# Patient Record
Sex: MALE | Race: WHITE | NOT HISPANIC OR LATINO | Employment: FULL TIME | URBAN - METROPOLITAN AREA
[De-identification: names, ages, dates, MRNs, and addresses within clinical notes are randomized per-mention and may not be internally consistent; named-entity substitution may affect disease eponyms.]

---

## 2018-02-27 ENCOUNTER — OFFICE VISIT (OUTPATIENT)
Dept: OBGYN CLINIC | Facility: CLINIC | Age: 33
End: 2018-02-27
Payer: COMMERCIAL

## 2018-02-27 VITALS
HEIGHT: 64 IN | WEIGHT: 184 LBS | SYSTOLIC BLOOD PRESSURE: 130 MMHG | BODY MASS INDEX: 31.41 KG/M2 | DIASTOLIC BLOOD PRESSURE: 72 MMHG

## 2018-02-27 DIAGNOSIS — M75.22 BICEPS TENDINITIS OF LEFT UPPER EXTREMITY: ICD-10-CM

## 2018-02-27 DIAGNOSIS — G56.22 CUBITAL TUNNEL SYNDROME, LEFT: Primary | ICD-10-CM

## 2018-02-27 PROCEDURE — 99203 OFFICE O/P NEW LOW 30 MIN: CPT | Performed by: ORTHOPAEDIC SURGERY

## 2018-02-27 RX ORDER — NAPROXEN 500 MG/1
250 TABLET ORAL 2 TIMES DAILY WITH MEALS
COMMUNITY

## 2018-02-27 NOTE — PROGRESS NOTES
Assessment/Plan:  1  Cubital tunnel syndrome, left     2  Biceps tendinitis of left upper extremity       Jhon has left-sided elbow pain which does appear to be a muscular injury to the biceps or brachialis muscle in the left arm  He does not appear to have a traumatic rupture of the distal biceps as he has no pop by deformity or ecchymosis and full strength on examination  He can likely continue to exercise as tolerated  If his pain persists or worsens we could consider imaging with an MRI  He also has developed cubital tunnel syndrome on the left side  I did inform him of trying to avoid striking the ulnar nerve and that his symptoms can be exacerbated with trauma  I did give him some exercises and avoidance of certain activities in the gym could improve this  If he has persistent pain and EMG or imaging with an MRI could be of value  Subjective:   Apollo Nicolas is a 34 y o  male who presents for evaluation for a left elbow injury  He states that he injured his elbow around 2 weeks ago while he was working out in [employer]  He states that he was doing upright row exercises and felt a popping sensation over his left upper arm and biceps region  He did have some initial swelling and discomfort without bruising  He has not noticed a deformity in his elbow or biceps tendon  He states that he has been resting and trying to reduce the amount of upper body lifting ever since  The pain has improved but he still feeling some discomfort to deep palpation over the biceps  He denies any weakness  He denies any radiating pain  He denies any numbness  He also reports a similar discomfort in the elbow on the opposite side of the elbow near the olecranon  He states that this can bother him from time to time and does not seem to be related to that exact injury  He reports an occasional sharp stabbing pain over the medial elbow that could radiate down the arm    He will occasionally get some numbness into the fingers in the hand in ulnar distribution  This seems to worsen any time he has trauma to this area of the elbow after a fall or with exercise  He does report this occurring after a fall initially over the elbow  Review of Systems      History reviewed  No pertinent past medical history  History reviewed  No pertinent surgical history  Family History   Problem Relation Age of Onset    Arthritis Maternal Grandmother     Arthritis Maternal Grandfather        Social History     Occupational History    Not on file  Social History Main Topics    Smoking status: Current Every Day Smoker     Packs/day: 1 00     Years: 13 00    Smokeless tobacco: Never Used    Alcohol use Yes      Comment: social    Drug use: No    Sexual activity: Not on file         Current Outpatient Prescriptions:     naproxen (NAPROSYN) 500 mg tablet, Take 250 mg by mouth 2 (two) times a day with meals, Disp: , Rfl:     No Known Allergies    Objective:  Vitals:    02/27/18 1634   BP: 130/72       Right Elbow Exam     Tenderness   Right elbow tenderness location: Tenderness to palpation over the lateral aspect of the biceps and brachialis muscle  No tenderness to palpation over distal biceps insertion  Able to fully palpate and Ephraim the distal biceps insertion  No overlying ecchymosis  No luisa deformity prese  Range of Motion   The patient has normal right elbow ROM  Muscle Strength   The patient has normal right elbow strength  Tests Varus: negative  Valgus: negative  Tinel's Sign (cubital tunnel): positive    Other   Erythema: absent  Sensation: normal  Pulse: present            Physical Exam   Constitutional: He is oriented to person, place, and time  He appears well-developed  HENT:   Head: Normocephalic and atraumatic  Eyes: Conjunctivae are normal    Neck: Neck supple  Cardiovascular: Intact distal pulses  Pulmonary/Chest: Effort normal    Abdominal: Soft     Neurological: He is alert and oriented to person, place, and time  Skin: Skin is warm and dry  Psychiatric: He has a normal mood and affect  His behavior is normal    Vitals reviewed

## 2020-07-15 NOTE — PROGRESS NOTES
Assessment and Plan:   Mr Ally Cohen is a 41-year-old  male with history significant for Lyme disease, who presents for further evaluation of chronic bilateral shoulder and knee pain, and to rule out concerns for rheumatoid arthritis given his family history  He is self referred  Jorge Pantoja presents today for further evaluation of chronic bilateral shoulder and knee pain, that appears intermittently and is most likely related to overuse activities  He does not particularly describe other concerning joint pains or symptoms suggestive of an inflammatory arthritis such as recent joint swelling or prolonged morning stiffness  His physical examination is unrevealing today  I reviewed the labs done by his primary care physician which appears unrevealing and advised him that the minimal elevation in the ESR is nonspecific and I would not be concerned for ongoing inflammation     - He does not need any further evaluation today - I offered him baseline x-rays, but he would like to hold off on this for now which I think is reasonable as they would likely be low yield  He can continue to manage his symptoms with NSAIDs as needed  I did advise him if over time he is to notice any worsening joint pains or recurrent joint swelling, to call me back at which time we can re-evaluate if indicated  Plan:  Diagnoses and all orders for this visit:    Chronic pain of both shoulders    Chronic pain of both knees    History of Lyme disease    Vitamin D insufficiency    Family history of rheumatoid arthritis      Activities as tolerated    Diet: low carb/low fat, more greens/vegetables, adequate hydration  Exercise: try to maintain a low impact exercise regimen as much as possible  Walk for 30 minutes a day for at least 3 days a week    Encouraged to maintain good sleep hygiene  Continue other medications as prescribed by PCP and other specialists        RTC PRN         HPI  Mr Ally Cohen is a 41-year-old  male with history significant for Lyme disease, who presents for further evaluation of chronic bilateral shoulder and knee pain, and to rule out concerns for rheumatoid arthritis given his family history  He is self referred  Patient reports he has experienced chronic intermittent pain of his bilateral shoulders, hips and knees  He states that his symptoms occur intermittently and are usually aggravated by activities  He is not symptomatic while performing the activities, but will notice of flare-up later on  He denies any significant joint pains of his hands, wrists, elbows, ankles or feet  He has not noticed any joint swelling  He does experience morning stiffness which affects her diffusely and typically improves within 20 minutes after taking a hot shower  He does not really take any over-the-counter medications, but on occasion has tried ibuprofen which does help  He reports approximately 8-9 years ago he was diagnosed with Lyme disease and at that time had swelling of his right shoulder and left knee  He was on multiple courses of antibiotics which significantly helped, and he states usually on an annual basis if his symptoms flare-up he will receive a 2 week course of doxycycline which helps  Other than the Lyme disease he reports being healthy  He denies fevers, unintentional weight loss, inflammatory eye disease, skin rash, psoriasis, mouth/nose ulcers, swollen glands or inflammatory bowel disease  He reports a family history of rheumatoid arthritis in his paternal aunt  Given his family history he was concerned about ruling out rheumatoid arthritis and was seen by [facility]  He had testing done which showed a minimally elevated ESR of 17 and a borderline low vitamin-D level of 21 3  His ALT was elevated at 82  An MIRELLA, rheumatoid factor, anti CCP antibody, TSH, urinalysis, CBC and remainder of the CMP was unremarkable    He mentions within the past year he has had an x-ray of his left shoulder done following a work related injury, that was apparently unrevealing  He has not had any other recent x-rays  The following portions of the patient's history were reviewed and updated as appropriate: allergies, current medications, past family history, past medical history, past social history, past surgical history and problem list       Review of Systems  Constitutional: Negative for weight change, fevers, chills, night sweats, fatigue  ENT/Mouth: Negative for hearing changes, ear pain, nasal congestion, sinus pain, hoarseness, sore throat, rhinorrhea, swallowing difficulty  Eyes: Negative for pain, redness, discharge, vision changes  Cardiovascular: Negative for chest pain, SOB, palpitations  Respiratory: Negative for cough, sputum, wheezing, dyspnea  Gastrointestinal: Negative for nausea, vomiting, diarrhea, constipation, pain, heartburn  Genitourinary: Negative for dysuria, urinary frequency, hematuria  Musculoskeletal: As per HPI  Skin: Negative for skin rash, color changes  Neuro: Negative for weakness, numbness, tingling, loss of consciousness  Psych: Negative for anxiety, depression  Heme/Lymph: Negative for easy bruising, bleeding, lymphadenopathy  Past Medical History:   Diagnosis Date    Lyme disease        History reviewed  No pertinent surgical history        Social History     Socioeconomic History    Marital status: Single     Spouse name: Not on file    Number of children: Not on file    Years of education: Not on file    Highest education level: Not on file   Occupational History    Not on file   Social Needs    Financial resource strain: Not on file    Food insecurity:     Worry: Not on file     Inability: Not on file    Transportation needs:     Medical: Not on file     Non-medical: Not on file   Tobacco Use    Smoking status: Former Smoker     Packs/day: 1 00     Years: 13 00     Pack years: 13 00    Smokeless tobacco: Never Used    Tobacco comment: now vaping   Substance and Sexual Activity    Alcohol use: Yes     Comment: social    Drug use: No    Sexual activity: Not on file   Lifestyle    Physical activity:     Days per week: Not on file     Minutes per session: Not on file    Stress: Not on file   Relationships    Social connections:     Talks on phone: Not on file     Gets together: Not on file     Attends Religion service: Not on file     Active member of club or organization: Not on file     Attends meetings of clubs or organizations: Not on file     Relationship status: Not on file    Intimate partner violence:     Fear of current or ex partner: Not on file     Emotionally abused: Not on file     Physically abused: Not on file     Forced sexual activity: Not on file   Other Topics Concern    Not on file   Social History Narrative    Not on file       Family History   Problem Relation Age of Onset    Arthritis Maternal Grandmother     Arthritis Maternal Grandfather        No Known Allergies      Current Outpatient Medications:     azithromycin (ZITHROMAX) 1 g powder, , Disp: , Rfl:     naproxen (NAPROSYN) 500 mg tablet, Take 250 mg by mouth 2 (two) times a day with meals, Disp: , Rfl:     terbinafine (LamISIL) 1 % cream, , Disp: , Rfl:       Objective:    Vitals:    07/16/20 0814   BP: 131/87   Pulse: 67   Temp: (!) 96 5 °F (35 8 °C)   Weight: 89 9 kg (198 lb 3 2 oz)       Physical Exam  General: Well appearing, well nourished, in no distress  Oriented x 3, normal mood and affect  Ambulating without difficulty  Skin: Good turgor, no rash, unusual bruising or prominent lesions  Hair: Normal texture and distribution  Nails: Normal color, no deformities  HEENT:  Head: Normocephalic, atraumatic  Eyes: Conjunctiva clear, sclera non-icteric, EOM intact  Neck: Supple, thyroid non-enlarged and non-tender  No lymphadenopathy  Extremities: No amputations or deformities, cyanosis, edema    Musculoskeletal:   There is no peripheral joint soft tissue swelling or tenderness noted  He has good range of motion in all joints  There is no enthesitis or dactylitis  Neurologic: Alert and oriented  No focal neurological deficits appreciated  Psychiatric: Normal mood and affect  KEVIN Strong    Rheumatology

## 2020-07-16 ENCOUNTER — OFFICE VISIT (OUTPATIENT)
Dept: RHEUMATOLOGY | Facility: CLINIC | Age: 35
End: 2020-07-16
Payer: COMMERCIAL

## 2020-07-16 VITALS
DIASTOLIC BLOOD PRESSURE: 87 MMHG | WEIGHT: 198.2 LBS | HEART RATE: 67 BPM | BODY MASS INDEX: 34.02 KG/M2 | SYSTOLIC BLOOD PRESSURE: 131 MMHG | TEMPERATURE: 96.5 F

## 2020-07-16 DIAGNOSIS — Z86.19 HISTORY OF LYME DISEASE: ICD-10-CM

## 2020-07-16 DIAGNOSIS — E55.9 VITAMIN D INSUFFICIENCY: ICD-10-CM

## 2020-07-16 DIAGNOSIS — M25.561 CHRONIC PAIN OF BOTH KNEES: ICD-10-CM

## 2020-07-16 DIAGNOSIS — G89.29 CHRONIC PAIN OF BOTH SHOULDERS: Primary | ICD-10-CM

## 2020-07-16 DIAGNOSIS — M25.511 CHRONIC PAIN OF BOTH SHOULDERS: Primary | ICD-10-CM

## 2020-07-16 DIAGNOSIS — Z82.61 FAMILY HISTORY OF RHEUMATOID ARTHRITIS: ICD-10-CM

## 2020-07-16 DIAGNOSIS — G89.29 CHRONIC PAIN OF BOTH KNEES: ICD-10-CM

## 2020-07-16 DIAGNOSIS — M25.512 CHRONIC PAIN OF BOTH SHOULDERS: Primary | ICD-10-CM

## 2020-07-16 DIAGNOSIS — M25.562 CHRONIC PAIN OF BOTH KNEES: ICD-10-CM

## 2020-07-16 PROCEDURE — 99204 OFFICE O/P NEW MOD 45 MIN: CPT | Performed by: INTERNAL MEDICINE

## 2020-07-16 RX ORDER — AZITHROMYCIN 1 G
PACKET (EA) ORAL
COMMUNITY
Start: 2020-06-25

## 2020-08-18 ENCOUNTER — NURSE TRIAGE (OUTPATIENT)
Dept: OTHER | Facility: OTHER | Age: 35
End: 2020-08-18

## 2020-08-18 DIAGNOSIS — U07.1 COVID-19: Primary | ICD-10-CM

## 2020-08-18 DIAGNOSIS — U07.1 COVID-19: ICD-10-CM

## 2020-08-18 PROCEDURE — U0003 INFECTIOUS AGENT DETECTION BY NUCLEIC ACID (DNA OR RNA); SEVERE ACUTE RESPIRATORY SYNDROME CORONAVIRUS 2 (SARS-COV-2) (CORONAVIRUS DISEASE [COVID-19]), AMPLIFIED PROBE TECHNIQUE, MAKING USE OF HIGH THROUGHPUT TECHNOLOGIES AS DESCRIBED BY CMS-2020-01-R: HCPCS | Performed by: FAMILY MEDICINE

## 2020-08-18 NOTE — TELEPHONE ENCOUNTER
Reason for Disposition   [1] Caller concerned that exposure to COVID-19 occurred BUT [2] does not meet COVID-19 EXPOSURE criteria from [facility] [place]    Answer Assessment - Initial Assessment Questions  1  CLOSE CONTACT: "Who is the person with the confirmed or suspected COVID-19 infection that you were exposed to?"     Close friend    2  PLACE of CONTACT: "Where were you when you were exposed to COVID-19?" (e g , home, school, medical waiting room; which city?)      In a room with fans running, close proximity for 1-2 hours  5  DATE of CONTACT: "When did you have contact with a COVID-19 patient?" (e g , how many days ago)   Monday, August 10th  6  TRAVEL: "Have you traveled out of the country recently?" If so, "When and where? 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

## 2020-08-18 NOTE — TELEPHONE ENCOUNTER
Regarding: Covid test  ----- Message from Edi Valentine sent at 8/18/2020 11:19 AM EDT -----  "I work for [employer] and I had exposure to someone who tested positive    I am technically a , and I am required to be tested for work "

## 2020-08-19 LAB — SARS-COV-2 RNA SPEC QL NAA+PROBE: NOT DETECTED

## 2020-12-09 ENCOUNTER — NURSE TRIAGE (OUTPATIENT)
Dept: OTHER | Facility: OTHER | Age: 35
End: 2020-12-09

## 2020-12-09 DIAGNOSIS — U07.1 COVID-19: ICD-10-CM

## 2020-12-09 DIAGNOSIS — U07.1 COVID-19: Primary | ICD-10-CM

## 2020-12-09 PROCEDURE — U0003 INFECTIOUS AGENT DETECTION BY NUCLEIC ACID (DNA OR RNA); SEVERE ACUTE RESPIRATORY SYNDROME CORONAVIRUS 2 (SARS-COV-2) (CORONAVIRUS DISEASE [COVID-19]), AMPLIFIED PROBE TECHNIQUE, MAKING USE OF HIGH THROUGHPUT TECHNOLOGIES AS DESCRIBED BY CMS-2020-01-R: HCPCS | Performed by: FAMILY MEDICINE

## 2020-12-10 LAB — SARS-COV-2 RNA SPEC QL NAA+PROBE: NOT DETECTED

## 2021-08-19 ENCOUNTER — NURSE TRIAGE (OUTPATIENT)
Dept: OTHER | Facility: OTHER | Age: 36
End: 2021-08-19

## 2021-08-19 DIAGNOSIS — Z20.828 SARS-ASSOCIATED CORONAVIRUS EXPOSURE: Primary | ICD-10-CM

## 2021-08-19 PROCEDURE — U0005 INFEC AGEN DETEC AMPLI PROBE: HCPCS | Performed by: FAMILY MEDICINE

## 2021-08-19 PROCEDURE — U0003 INFECTIOUS AGENT DETECTION BY NUCLEIC ACID (DNA OR RNA); SEVERE ACUTE RESPIRATORY SYNDROME CORONAVIRUS 2 (SARS-COV-2) (CORONAVIRUS DISEASE [COVID-19]), AMPLIFIED PROBE TECHNIQUE, MAKING USE OF HIGH THROUGHPUT TECHNOLOGIES AS DESCRIBED BY CMS-2020-01-R: HCPCS | Performed by: FAMILY MEDICINE

## 2021-08-19 NOTE — TELEPHONE ENCOUNTER
Reason for Disposition   [1] COVID-19 infection suspected by caller or triager AND [2] mild symptoms (cough, fever, or others) AND [6] no complications or SOB    Answer Assessment - Initial Assessment Questions  1  COVID-19 DIAGNOSIS: "Who made your Coronavirus (COVID-19) diagnosis?" "Was it confirmed by a positive lab test?" If not diagnosed by a HCP, ask "Are there lots of cases (community spread) where you live?" (See public health department website, if unsure)      Community spread  2  COVID-19 EXPOSURE: "Was there any known exposure to COVID before the symptoms began?" [facility] Definition of close contact: within 6 feet (2 meters) for a total of 15 minutes or more over a 24-hour period  At Contractually this weekend  3  ONSET: "When did the COVID-19 symptoms start?"       48 hours  4  WORST SYMPTOM: "What is your worst symptom?" (e g , cough, fever, shortness of breath, muscle aches)      Nasal congestion  5  COUGH: "Do you have a cough?" If Yes, ask: "How bad is the cough?"        Wet productive cough  6  FEVER: "Do you have a fever?" If Yes, ask: "What is your temperature, how was it measured, and when did it start?"      100 3 oral  7  RESPIRATORY STATUS: "Describe your breathing?" (e g , shortness of breath, wheezing, unable to speak)       Unremarkable, able to converse with ease  8  BETTER-SAME-WORSE: "Are you getting better, staying the same or getting worse compared to yesterday?"  If getting worse, ask, "In what way?"      Same  9  HIGH RISK DISEASE: "Do you have any chronic medical problems?" (e g , asthma, heart or lung disease, weak immune system, obesity, etc )  Obese  11   OTHER SYMPTOMS: "Do you have any other symptoms?"  (e g , chills, fatigue, headache, loss of smell or taste, muscle pain, sore throat; new loss of smell or taste especially support the diagnosis of COVID-19)        Loss of smell, sore throat, myalgias    Protocols used: CORONAVIRUS (COVID-19) DIAGNOSED OR SUSPECTED-ADULT-[place]

## 2022-04-07 ENCOUNTER — OFFICE VISIT (OUTPATIENT)
Dept: URGENT CARE | Facility: CLINIC | Age: 37
End: 2022-04-07
Payer: COMMERCIAL

## 2022-04-07 VITALS — RESPIRATION RATE: 18 BRPM | TEMPERATURE: 97.9 F | HEART RATE: 87 BPM | OXYGEN SATURATION: 95 %

## 2022-04-07 DIAGNOSIS — R68.89 FLU-LIKE SYMPTOMS: Primary | ICD-10-CM

## 2022-04-07 PROCEDURE — 87636 SARSCOV2 & INF A&B AMP PRB: CPT

## 2022-04-07 PROCEDURE — 99213 OFFICE O/P EST LOW 20 MIN: CPT

## 2022-04-07 NOTE — PROGRESS NOTES
[address] [facility]        NAME: Mary Phillips is a 39 y o  male  : 1985    MRN: [id number]  DATE: 2022  TIME: 3:22 PM    Assessment and Plan   Flu-like symptoms [R68 89]  1  Flu-like symptoms  Covid/Flu-Office Collect   Clinical presentation consistent with acute viral illness  Patient reports that his symptoms are actually beginning to improve today  Discussed with patient plan to send for COVID/Flu  Recommend adequate rest, hydration, and symptom support with OTC MPAP/NSAIDs  Patient Instructions   Report to emergency department if:  -Chest pain/SOB/wheezing  -Intractable fever > 100 4   -Unable to tolerate P O  fluids or manage saliva    Follow up with PCP in 3-5 days  Proceed to  ER if symptoms worsen  Chief Complaint     Chief Complaint   Patient presents with    Influenza     body aches, fatigue since Tuesday, night sweats, headaches, lightheadedness, confusion, diarrhea, decreased appetite, chills         History of Present Illness       Patient is a 39 y o  male who presents for chief complaint of flu-like symptoms  PMH is non-contributory  He is COVID vaccinated x 3 and had one negative home COVID test  He reports body aches, chills, diarrhea and fatigue  He works in [employer], and last week cleaned out a "pretty dirty furnace"  He reports that some co-workers mentioned it could be Legionnaire's disease  Denies fever, cough, rash, sputum production, chest pain, SOB  Denies any report of Legionnaire's disease in his workplace or at any place in which he has worked within the past 6 months  He has been taking Tylenol for his symptoms with some relief  Review of Systems   Review of Systems   Constitutional: Positive for chills and fatigue  Negative for activity change and fever  Felt "feverish"   HENT: Negative for congestion, ear pain, rhinorrhea, sinus pressure, sinus pain and sore throat  Eyes: Negative for discharge and redness     Respiratory: Negative for apnea, cough, choking, chest tightness, shortness of breath, wheezing and stridor  Cardiovascular: Negative for chest pain  Gastrointestinal: Positive for diarrhea  Negative for nausea and vomiting  Endocrine: Negative  Genitourinary: Negative  Musculoskeletal: Negative for arthralgias, back pain, joint swelling, myalgias, neck pain and neck stiffness  Skin: Negative  Allergic/Immunologic: Negative  Neurological: Negative for dizziness, syncope, weakness, light-headedness, numbness and headaches  Hematological: Negative  Negative for adenopathy  Psychiatric/Behavioral: Negative  Current Medications       Current Outpatient Medications:     azithromycin (ZITHROMAX) 1 g powder, , Disp: , Rfl:     naproxen (NAPROSYN) 500 mg tablet, Take 250 mg by mouth 2 (two) times a day with meals (Patient not taking: Reported on 4/7/2022 ), Disp: , Rfl:     terbinafine (LamISIL) 1 % cream, , Disp: , Rfl:     Current Allergies     Allergies as of 04/07/2022    (No Known Allergies)            The following portions of the patient's history were reviewed and updated as appropriate: allergies, current medications, past family history, past medical history, past social history, past surgical history and problem list      Past Medical History:   Diagnosis Date    Lyme disease        History reviewed  No pertinent surgical history  Family History   Problem Relation Age of Onset    Arthritis Maternal Grandmother     Arthritis Maternal Grandfather          Medications have been verified  Objective   Pulse 87   Temp 97 9 °F (36 6 °C)   SpO2 95%        Physical Exam     Physical Exam  Constitutional:       Appearance: Normal appearance  Interventions: He is not intubated  HENT:      Head: Normocephalic and atraumatic        Right Ear: Hearing, tympanic membrane, ear canal and external ear normal       Left Ear: Hearing, tympanic membrane, ear canal and external ear normal       Nose: No congestion or rhinorrhea  Mouth/Throat:      Mouth: Mucous membranes are moist       Pharynx: No pharyngeal swelling, oropharyngeal exudate, posterior oropharyngeal erythema or uvula swelling  Tonsils: No tonsillar exudate or tonsillar abscesses  Eyes:      Extraocular Movements: Extraocular movements intact  Pupils: Pupils are equal, round, and reactive to light  Cardiovascular:      Rate and Rhythm: Normal rate and regular rhythm  Heart sounds: Normal heart sounds, S1 normal and S2 normal  No murmur heard  No friction rub  No gallop  Pulmonary:      Effort: Pulmonary effort is normal  No tachypnea, bradypnea, accessory muscle usage, prolonged expiration, respiratory distress or retractions  He is not intubated  Breath sounds: Normal breath sounds and air entry  No stridor, decreased air movement or transmitted upper airway sounds  No decreased breath sounds, wheezing, rhonchi or rales  Musculoskeletal:         General: Normal range of motion  Cervical back: Normal range of motion and neck supple  No rigidity or tenderness  Skin:     General: Skin is warm and dry  Capillary Refill: Capillary refill takes less than 2 seconds  Findings: No erythema  Neurological:      General: No focal deficit present  Mental Status: He is alert     Psychiatric:         Mood and Affect: Mood normal

## 2022-04-07 NOTE — PATIENT INSTRUCTIONS
Viral Syndrome   WHAT YOU NEED TO KNOW:   Viral syndrome is a term used for symptoms of an infection caused by a virus  Viruses are spread easily from person to person through the air and on shared items  DISCHARGE INSTRUCTIONS:   Call your local emergency number (911 in the [address] [facility], located within [facility] [facility]) or have someone else call if:   · You have a seizure  · You cannot be woken  · You have chest pain or trouble breathing  Return to the emergency department if:   · You have a stiff neck, a bad headache, and sensitivity to light  · You feel weak, dizzy, or confused  · You stop urinating or urinate a lot less than usual     · You cough up blood or thick yellow or green mucus  · You have severe abdominal pain or your abdomen is larger than usual     Call your doctor if:   · Your symptoms do not get better with treatment or get worse after 3 days  · You have a rash or ear pain  · You have burning when you urinate  · You have questions or concerns about your condition or care  Medicines: You may  need any of the following:  · Acetaminophen  decreases pain and fever  It is available without a doctor's order  Ask how much to take and how often to take it  Follow directions  Read the labels of all other medicines you are using to see if they also contain acetaminophen, or ask your doctor or pharmacist  Acetaminophen can cause liver damage if not taken correctly  Do not use more than 4 grams (4,000 milligrams) total of acetaminophen in one day  · NSAIDs , such as ibuprofen, help decrease swelling, pain, and fever  NSAIDs can cause stomach bleeding or kidney problems in certain people  If you take blood thinner medicine, always ask your healthcare provider if NSAIDs are safe for you  Always read the medicine label and follow directions  · Cold medicine  helps decrease swelling, control a cough, and relieve chest or nasal congestion  · Saline nasal spray  helps decrease nasal congestion       · Take your medicine as directed  Contact your healthcare provider if you think your medicine is not helping or if you have side effects  Tell him of her if you are allergic to any medicine  Keep a list of the medicines, vitamins, and herbs you take  Include the amounts, and when and why you take them  Bring the list or the pill bottles to follow-up visits  Carry your medicine list with you in case of an emergency  Manage your symptoms:   · Drink liquids as directed to prevent dehydration  Ask how much liquid to drink each day and which liquids are best for you  Ask if you should drink an oral rehydration solution (ORS)  An ORS has the right amounts of water, salts, and sugar you need to replace body fluids  This may help prevent dehydration caused by vomiting or diarrhea  Do not drink liquids with caffeine  Liquids with caffeine can make dehydration worse  · Get plenty of rest to help your body heal   Take naps throughout the day  Ask your healthcare provider when you can return to work and your normal activities  · Use a cool mist humidifier to help you breathe easier  Ask your healthcare provider how to use a cool mist humidifier  · Eat honey or use cough drops for a sore throat  Cough drops are available without a doctor's order  Follow directions for taking cough drops  · Do not smoke or be close to anyone who is smoking  Nicotine and other chemicals in cigarettes and cigars can cause lung damage  Smoking can also delay healing  Ask your healthcare provider for information if you currently smoke and need help to quit  E-cigarettes or smokeless tobacco still contain nicotine  Talk to your healthcare provider before you use these products  Prevent the spread of germs:       · Wash your hands often  Wash your hands several times each day  Wash after you use the bathroom, change a child's diaper, and before you prepare or eat food  Use soap and water every time   Rub your soapy hands together, lacing your fingers  Wash the front and back of your hands, and in between your fingers  Use the fingers of one hand to scrub under the fingernails of the other hand  Wash for at least 20 seconds  Rinse with warm, running water for several seconds  Then dry your hands with a clean towel or paper towel  Use hand  that contains alcohol if soap and water are not available  Do not touch your eyes, nose, or mouth without washing your hands first          · Cover a sneeze or cough  Use a tissue that covers your mouth and nose  Throw the tissue away in a trash can right away  Use the bend of your arm if a tissue is not available  Wash your hands well with soap and water or use a hand   · Stay away from others while you are sick  Avoid crowds as much as possible  · Ask about vaccines you may need  Talk to your healthcare provider about your vaccine history  He or she will tell you which vaccines you need, and when to get them  ? Get the influenza (flu) vaccine as soon as recommended each year  The flu vaccine is available starting in September or October  Flu viruses change, so it is important to get a flu vaccine every year  ? Get the pneumonia vaccine if recommended  This vaccine is usually recommended every 5 years  Your provider will tell you when to get this vaccine, if needed  Follow up with your doctor as directed:  Write down your questions so you remember to ask them during your visits  © Copyright [employer] 2022 Information is for End User's use only and may not be sold, redistributed or otherwise used for commercial purposes  All illustrations and images included in CareNotes® are the copyrighted property of A D A M [employer] Inc  or Leia Luz  The above information is an  only  It is not intended as medical advice for individual conditions or treatments   Talk to your doctor, nurse or pharmacist before following any medical regimen to see if it is safe and effective for you

## 2022-04-07 NOTE — LETTER
April 7, 2022     Patient: Elvis Mario   YOB: 1985   Date of Visit: 4/7/2022       To Whom it May Concern:    Stewart Mengdianne was seen in my clinic on 4/7/2022  He  may return on 4/11/22  If you have any questions or concerns, please don't hesitate to call  Sincerely,          EVAN Pena        CC: [place]

## 2022-04-08 LAB
FLUAV RNA RESP QL NAA+PROBE: NEGATIVE
FLUBV RNA RESP QL NAA+PROBE: NEGATIVE
SARS-COV-2 RNA RESP QL NAA+PROBE: POSITIVE

## 2022-07-29 ENCOUNTER — OFFICE VISIT (OUTPATIENT)
Dept: URGENT CARE | Facility: CLINIC | Age: 37
End: 2022-07-29
Payer: COMMERCIAL

## 2022-07-29 VITALS
RESPIRATION RATE: 18 BRPM | DIASTOLIC BLOOD PRESSURE: 90 MMHG | TEMPERATURE: 97.8 F | BODY MASS INDEX: 35.19 KG/M2 | OXYGEN SATURATION: 97 % | HEART RATE: 83 BPM | WEIGHT: 205 LBS | SYSTOLIC BLOOD PRESSURE: 147 MMHG

## 2022-07-29 DIAGNOSIS — S50.861A INSECT BITE OF RIGHT FOREARM, INITIAL ENCOUNTER: Primary | ICD-10-CM
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
## 2022-07-29 DIAGNOSIS — L73.9 FOLLICULITIS: ICD-10-CM

## 2022-07-29 PROCEDURE — 99213 OFFICE O/P EST LOW 20 MIN: CPT | Performed by: PHYSICIAN ASSISTANT

## 2022-07-29 RX ORDER — DOXYCYCLINE 100 MG/1
100 CAPSULE ORAL 2 TIMES DAILY
Qty: 14 CAPSULE | Refills: 0 | Status: SHIPPED | OUTPATIENT
Start: 2022-07-29 | End: 2022-08-05

## 2022-07-29 NOTE — PROGRESS NOTES
[facility]        NAME: Oliver Villalba is a 44 y o  male  : 1985    MRN: [id number]  DATE: 2022  TIME: 7:57 PM    Assessment and Plan   Insect bite of right forearm, initial encounter [S50 861A, W57  XXXA]  1  Insect bite of right forearm, initial encounter  doxycycline monohydrate (MONODOX) 100 mg capsule   2  Folliculitis  doxycycline monohydrate (MONODOX) 100 mg capsule         Patient Instructions     Patient Instructions   Take antibiotic as prescribed for the next 7 days  Continue to keep area clean and dry  Follow up with PCP in 3-5 days  Proceed to  ER if symptoms worsen  Chief Complaint     Chief Complaint   Patient presents with    Wound    Wound Check     Thought he had a pimple on his right forearm  Squeezed it several times  Now its red  Unsure if it was a bug bite  Also has faliculits on his thighs and it is " acting up"  Also says he has lyme and feels it is acting up and thinks he might need Doxycycline again for the " brain fog"  History of Present Illness       Patient is a 70-year-old male presenting today with wound of right forearm x3 days  Patient notes a couple days ago he noticed a red raised area on the right side of his forearm, believes he may have been bit by something, states that he has had some worsening redness and swelling around the area and is worried about infection, has been keeping area clean and dry  Notes he is also having a flare-up of his folliculitis of his thighs, notes he has a few red raised areas of his thighs  Denies fever, chills, N/V/D, numbness, tingling  Review of Systems   Review of Systems   Constitutional: Negative for chills and fever  HENT: Negative for ear pain and sore throat  Eyes: Negative for pain and visual disturbance  Respiratory: Negative for cough and shortness of breath  Cardiovascular: Negative for chest pain and palpitations     Gastrointestinal: Negative for abdominal pain and vomiting  Genitourinary: Negative for dysuria and hematuria  Musculoskeletal: Negative for arthralgias and back pain  Skin:        See HPI   Neurological: Negative for seizures and syncope  All other systems reviewed and are negative  Current Medications       Current Outpatient Medications:     doxycycline monohydrate (MONODOX) 100 mg capsule, Take 1 capsule (100 mg total) by mouth 2 (two) times a day for 7 days, Disp: 14 capsule, Rfl: 0    azithromycin (ZITHROMAX) 1 g powder, , Disp: , Rfl:     naproxen (NAPROSYN) 500 mg tablet, Take 250 mg by mouth 2 (two) times a day with meals (Patient not taking: Reported on 4/7/2022 ), Disp: , Rfl:     terbinafine (LamISIL) 1 % cream, , Disp: , Rfl:     Current Allergies     Allergies as of 07/29/2022    (No Known Allergies)            The following portions of the patient's history were reviewed and updated as appropriate: allergies, current medications, past family history, past medical history, past social history, past surgical history and problem list      Past Medical History:   Diagnosis Date    Lyme disease        History reviewed  No pertinent surgical history  Family History   Problem Relation Age of Onset    Arthritis Maternal Grandmother     Arthritis Maternal Grandfather          Medications have been verified  Objective   /90   Pulse 83   Temp 97 8 °F (36 6 °C)   Resp 18   Wt 93 kg (205 lb)   SpO2 97%   BMI 35 19 kg/m²        Physical Exam     Physical Exam  Vitals reviewed  Constitutional:       General: He is not in acute distress  Appearance: He is well-developed  He is not toxic-appearing  HENT:      Head: Normocephalic and atraumatic  Right Ear: Tympanic membrane, ear canal and external ear normal       Left Ear: Tympanic membrane, ear canal and external ear normal    Eyes:      Conjunctiva/sclera: Conjunctivae normal    Cardiovascular:      Rate and Rhythm: Normal rate and regular rhythm  Pulses: Normal pulses  Heart sounds: Normal heart sounds  Pulmonary:      Effort: Pulmonary effort is normal       Breath sounds: Normal breath sounds  Musculoskeletal:      Cervical back: Normal range of motion  No tenderness  Lymphadenopathy:      Cervical: No cervical adenopathy  Skin:     General: Skin is warm  Capillary Refill: Capillary refill takes less than 2 seconds  Findings: Rash present  Comments: Small 1 cm circumferential scabbed area of medial aspect of right forearm with slight surrounding redness, no purulent discharge or drainage, area is mildly tender to palpation  Multiple small 1-2 mm pustules of anterior aspect of thighs bilaterally diffusely, findings consistent with folliculitis  Neurological:      General: No focal deficit present  Mental Status: He is alert and oriented to person, place, and time

## 2023-10-05 ENCOUNTER — OFFICE VISIT (OUTPATIENT)
Dept: URGENT CARE | Facility: CLINIC | Age: 38
End: 2023-10-05
Payer: COMMERCIAL

## 2023-10-05 VITALS
SYSTOLIC BLOOD PRESSURE: 141 MMHG | OXYGEN SATURATION: 98 % | RESPIRATION RATE: 16 BRPM | HEART RATE: 85 BPM | DIASTOLIC BLOOD PRESSURE: 89 MMHG | TEMPERATURE: 98 F

## 2023-10-05 DIAGNOSIS — R05.1 ACUTE COUGH: Primary | ICD-10-CM

## 2023-10-05 DIAGNOSIS — J40 BRONCHITIS: ICD-10-CM

## 2023-10-05 PROCEDURE — 99213 OFFICE O/P EST LOW 20 MIN: CPT | Performed by: PHYSICIAN ASSISTANT

## 2023-10-05 RX ORDER — BENZONATATE 100 MG/1
100 CAPSULE ORAL 3 TIMES DAILY PRN
Qty: 20 CAPSULE | Refills: 0 | Status: SHIPPED | OUTPATIENT
Start: 2023-10-05

## 2023-10-05 RX ORDER — ALBUTEROL SULFATE 90 UG/1
2 AEROSOL, METERED RESPIRATORY (INHALATION) EVERY 6 HOURS PRN
Qty: 8 G | Refills: 0 | Status: SHIPPED | OUTPATIENT
Start: 2023-10-05

## 2023-10-05 RX ORDER — PREDNISONE 10 MG/1
40 TABLET ORAL DAILY
Qty: 16 TABLET | Refills: 0 | Status: SHIPPED | OUTPATIENT
Start: 2023-10-05 | End: 2023-10-09

## 2023-10-05 NOTE — PROGRESS NOTES
[facility]        NAME: Tayla Morales is a 43 y.o. male  : 1985    MRN: [id number]  DATE: 2023  TIME: 5:59 PM    Assessment and Plan   Acute cough [R05.1]  1. Acute cough        2. Bronchitis  albuterol (PROVENTIL HFA,VENTOLIN HFA) 90 mcg/act inhaler    predniSONE 10 mg tablet    benzonatate (TESSALON PERLES) 100 mg capsule            Patient Instructions   Patient Instructions     Acute Bronchitis   AMBULATORY CARE:   Acute bronchitis  is swelling and irritation in your lungs. It is usually caused by a virus and most often happens in the winter. Bronchitis may also be caused by bacteria or by a chemical irritant, such as smoke. Common symptoms:   • Cough that lasts up to 3 weeks    • Runny or stuffy nose    • Hoarseness, sore throat    • Fever    • Feeling more tired than usual, and body aches    • Wheezing or pain when you breathe or cough    Seek care immediately if:   • You cough up blood. • Your lips or fingernails turn blue. • You feel like you are not getting enough air when you breathe. Call your doctor if:   • Your symptoms do not go away or get worse, even after treatment. • Your cough does not get better within 4 weeks. • You have questions or concerns about your condition or care. Medicines: You may need any of the following:  • Cough suppressants  decrease your urge to cough. • Decongestants  help loosen mucus in your lungs and make it easier to cough up. This can help you breathe easier. • Inhalers  may be given. Your healthcare provider may give you one or more inhalers to help you breathe easier and cough less. An inhaler gives you medicine to open your airways. Ask your healthcare provider to show you how to use your inhaler correctly. • Antiviral medicine  treats infections caused by a virus. • Antibiotics  may be given if your bronchitis is caused by bacteria or if you have lung condition.     • Acetaminophen  decreases pain and fever. It is available without a doctor's order. Ask how much to take and how often to take it. Follow directions. Read the labels of all other medicines you are using to see if they also contain acetaminophen, or ask your doctor or pharmacist. Acetaminophen can cause liver damage if not taken correctly. • NSAIDs  help decrease swelling and pain or fever. This medicine is available with or without a doctor's order. NSAIDs can cause stomach bleeding or kidney problems in certain people. If you take blood thinner medicine, always ask your healthcare provider if NSAIDs are safe for you. Always read the medicine label and follow directions. Self-care:   • Drink liquids as directed. You may need to drink more liquids than usual to stay hydrated. Ask how much liquid to drink each day and which liquids are best for you. • Use a cool mist humidifier. This increases air moisture in your home. This may make it easier for you to breathe and help decrease your cough. • Get more rest.  Rest helps your body to heal. Slowly start to do more each day. Rest when you feel it is needed. Prevent acute bronchitis:       • Ask about vaccines you may need. Get a flu vaccine each year as soon as recommended, usually in September or October. Ask your healthcare provider if you should also get a pneumonia or COVID-19 vaccine. Your healthcare provider can tell you if you should also get other vaccines, and when to get them. • Prevent the spread of germs. You can decrease your risk for acute bronchitis and other illnesses by doing the following:    ? Wash your hands often with soap and water. Carry germ-killing hand lotion or gel with you. You can use the lotion or gel to clean your hands when soap and water are not available. ? Do not touch your eyes, nose, or mouth unless you have washed your hands first.    ? Always cover your mouth when you cough to prevent the spread of germs.  It is best to cough into a tissue or your shirt sleeve instead of into your hand. Ask those around you to cover their mouths when they cough. ? Try to avoid people who have a cold or the flu. If you are sick, stay away from others as much as possible. • Avoid irritants in the air. Avoid chemicals, fumes, and dust. Wear a face mask if you must work around dust or fumes. Stay inside on days when air pollution levels are high. If you have allergies, stay inside when pollen counts are high. Do not use aerosol products, such as spray-on deodorant, bug spray, and hair spray. • Do not smoke or be around others who are smoking. Nicotine and other chemicals in cigarettes and cigars can cause lung damage. Ask your healthcare provider for information if you currently smoke and need help to quit. E-cigarettes or smokeless tobacco still contain nicotine. Talk to your healthcare provider before you use these products. Follow up with your doctor as directed:  Write down questions you have so you will remember to ask them during your follow-up visits. © Copyright [facility] 2023 Information is for End User's use only and may not be sold, redistributed or otherwise used for commercial purposes. The above information is an  only. It is not intended as medical advice for individual conditions or treatments. Talk to your doctor, nurse or pharmacist before following any medical regimen to see if it is safe and effective for you. Follow up with PCP in 3-5 days. Proceed to  ER if symptoms worsen. Chief Complaint     Chief Complaint   Patient presents with   • Nasal Congestion     Pt presents with cough and congestion that started Sunday night. Taking Robitussin without relief. 0         History of Present Illness       The pt is a 69-year-old male presenting today for a cough and wheezing x 5 days. No hx of asthma. No CP or SOB. No fevers or chills.        Review of Systems   Review of Systems   Constitutional: Negative for activity change, appetite change, chills, diaphoresis and fever. HENT: Positive for congestion. Negative for ear pain, rhinorrhea and sore throat. Eyes: Negative for pain and visual disturbance. Respiratory: Positive for cough. Negative for chest tightness and shortness of breath. Cardiovascular: Negative for chest pain and palpitations. Gastrointestinal: Negative for abdominal pain, diarrhea, nausea and vomiting. Genitourinary: Negative for dysuria and hematuria. Musculoskeletal: Negative for arthralgias, back pain and myalgias. Skin: Negative for color change, pallor and rash. Neurological: Negative for seizures, syncope and headaches. All other systems reviewed and are negative. Current Medications       Current Outpatient Medications:   •  albuterol (PROVENTIL HFA,VENTOLIN HFA) 90 mcg/act inhaler, Inhale 2 puffs every 6 (six) hours as needed for wheezing, Disp: 8 g, Rfl: 0  •  benzonatate (TESSALON PERLES) 100 mg capsule, Take 1 capsule (100 mg total) by mouth 3 (three) times a day as needed for cough, Disp: 20 capsule, Rfl: 0  •  predniSONE 10 mg tablet, Take 4 tablets (40 mg total) by mouth daily for 4 days, Disp: 16 tablet, Rfl: 0  •  azithromycin (ZITHROMAX) 1 g powder, , Disp: , Rfl:   •  naproxen (NAPROSYN) 500 mg tablet, Take 250 mg by mouth 2 (two) times a day with meals (Patient not taking: Reported on 4/7/2022 ), Disp: , Rfl:   •  terbinafine (LamISIL) 1 % cream, , Disp: , Rfl:     Current Allergies     Allergies as of 10/05/2023   • (No Known Allergies)            The following portions of the patient's history were reviewed and updated as appropriate: allergies, current medications, past family history, past medical history, past social history, past surgical history and problem list.     Past Medical History:   Diagnosis Date   • Lyme disease        No past surgical history on file.     Family History   Problem Relation Age of Onset   • Arthritis Maternal Grandmother    • Arthritis Maternal Grandfather          Medications have been verified. Objective   /89   Pulse 85   Temp 98 °F (36.7 °C)   Resp 16   SpO2 98%        Physical Exam     Physical Exam  Vitals and nursing note reviewed. Constitutional:       General: He is not in acute distress. Appearance: Normal appearance. He is normal weight. He is not ill-appearing, toxic-appearing or diaphoretic. HENT:      Head: Normocephalic and atraumatic. Right Ear: Tympanic membrane, ear canal and external ear normal. There is no impacted cerumen. Left Ear: Tympanic membrane, ear canal and external ear normal. There is no impacted cerumen. Nose: Nose normal. No congestion or rhinorrhea. Mouth/Throat:      Mouth: Mucous membranes are moist.      Pharynx: Oropharynx is clear. No oropharyngeal exudate or posterior oropharyngeal erythema. Eyes:      Extraocular Movements: Extraocular movements intact. Conjunctiva/sclera: Conjunctivae normal.      Pupils: Pupils are equal, round, and reactive to light. Cardiovascular:      Rate and Rhythm: Normal rate and regular rhythm. Heart sounds: Normal heart sounds. No murmur heard. No friction rub. No gallop. Pulmonary:      Effort: Pulmonary effort is normal. No respiratory distress. Breath sounds: No stridor. Wheezing (lower lung fields expiratory ) present. No rhonchi or rales. Chest:      Chest wall: No tenderness. Abdominal:      General: Abdomen is flat. Bowel sounds are normal. There is no distension. Palpations: Abdomen is soft. Tenderness: There is no abdominal tenderness. There is no guarding. Musculoskeletal:         General: Normal range of motion. Cervical back: Normal range of motion. Lymphadenopathy:      Cervical: No cervical adenopathy. Skin:     General: Skin is warm and dry. Capillary Refill: Capillary refill takes less than 2 seconds. Neurological:      Mental Status: He is alert.

## 2023-10-05 NOTE — PATIENT INSTRUCTIONS
Acute Bronchitis   AMBULATORY CARE:   Acute bronchitis  is swelling and irritation in your lungs. It is usually caused by a virus and most often happens in the winter. Bronchitis may also be caused by bacteria or by a chemical irritant, such as smoke. Common symptoms:   Cough that lasts up to 3 weeks    Runny or stuffy nose    Hoarseness, sore throat    Fever    Feeling more tired than usual, and body aches    Wheezing or pain when you breathe or cough    Seek care immediately if:   You cough up blood. Your lips or fingernails turn blue. You feel like you are not getting enough air when you breathe. Call your doctor if:   Your symptoms do not go away or get worse, even after treatment. Your cough does not get better within 4 weeks. You have questions or concerns about your condition or care. Medicines: You may need any of the following:  Cough suppressants  decrease your urge to cough. Decongestants  help loosen mucus in your lungs and make it easier to cough up. This can help you breathe easier. Inhalers  may be given. Your healthcare provider may give you one or more inhalers to help you breathe easier and cough less. An inhaler gives you medicine to open your airways. Ask your healthcare provider to show you how to use your inhaler correctly. Antiviral medicine  treats infections caused by a virus. Antibiotics  may be given if your bronchitis is caused by bacteria or if you have lung condition. Acetaminophen  decreases pain and fever. It is available without a doctor's order. Ask how much to take and how often to take it. Follow directions. Read the labels of all other medicines you are using to see if they also contain acetaminophen, or ask your doctor or pharmacist. Acetaminophen can cause liver damage if not taken correctly. NSAIDs  help decrease swelling and pain or fever. This medicine is available with or without a doctor's order.  NSAIDs can cause stomach bleeding or kidney problems in certain people. If you take blood thinner medicine, always ask your healthcare provider if NSAIDs are safe for you. Always read the medicine label and follow directions. Self-care:   Drink liquids as directed. You may need to drink more liquids than usual to stay hydrated. Ask how much liquid to drink each day and which liquids are best for you. Use a cool mist humidifier. This increases air moisture in your home. This may make it easier for you to breathe and help decrease your cough. Get more rest.  Rest helps your body to heal. Slowly start to do more each day. Rest when you feel it is needed. Prevent acute bronchitis:       Ask about vaccines you may need. Get a flu vaccine each year as soon as recommended, usually in September or October. Ask your healthcare provider if you should also get a pneumonia or COVID-19 vaccine. Your healthcare provider can tell you if you should also get other vaccines, and when to get them. Prevent the spread of germs. You can decrease your risk for acute bronchitis and other illnesses by doing the following:    Wash your hands often with soap and water. Carry germ-killing hand lotion or gel with you. You can use the lotion or gel to clean your hands when soap and water are not available. Do not touch your eyes, nose, or mouth unless you have washed your hands first.    Always cover your mouth when you cough to prevent the spread of germs. It is best to cough into a tissue or your shirt sleeve instead of into your hand. Ask those around you to cover their mouths when they cough. Try to avoid people who have a cold or the flu. If you are sick, stay away from others as much as possible. Avoid irritants in the air. Avoid chemicals, fumes, and dust. Wear a face mask if you must work around dust or fumes. Stay inside on days when air pollution levels are high. If you have allergies, stay inside when pollen counts are high.  Do not use aerosol products, such as spray-on deodorant, bug spray, and hair spray. Do not smoke or be around others who are smoking. Nicotine and other chemicals in cigarettes and cigars can cause lung damage. Ask your healthcare provider for information if you currently smoke and need help to quit. E-cigarettes or smokeless tobacco still contain nicotine. Talk to your healthcare provider before you use these products. Follow up with your doctor as directed:  Write down questions you have so you will remember to ask them during your follow-up visits. © Copyright [employer] 2023 Information is for End User's use only and may not be sold, redistributed or otherwise used for commercial purposes. The above information is an  only. It is not intended as medical advice for individual conditions or treatments. Talk to your doctor, nurse or pharmacist before following any medical regimen to see if it is safe and effective for you.

## 2023-10-07 ENCOUNTER — OFFICE VISIT (OUTPATIENT)
Dept: URGENT CARE | Facility: CLINIC | Age: 38
End: 2023-10-07
Payer: COMMERCIAL

## 2023-10-07 VITALS
SYSTOLIC BLOOD PRESSURE: 129 MMHG | TEMPERATURE: 97.8 F | RESPIRATION RATE: 20 BRPM | OXYGEN SATURATION: 95 % | HEART RATE: 75 BPM | DIASTOLIC BLOOD PRESSURE: 75 MMHG

## 2023-10-07 DIAGNOSIS — J01.90 ACUTE SINUSITIS, RECURRENCE NOT SPECIFIED, UNSPECIFIED LOCATION: ICD-10-CM

## 2023-10-07 DIAGNOSIS — R05.1 ACUTE COUGH: Primary | ICD-10-CM

## 2023-10-07 PROCEDURE — 99213 OFFICE O/P EST LOW 20 MIN: CPT | Performed by: NURSE PRACTITIONER

## 2023-10-07 RX ORDER — AZITHROMYCIN 250 MG/1
TABLET, FILM COATED ORAL
Qty: 6 TABLET | Refills: 0 | Status: SHIPPED | OUTPATIENT
Start: 2023-10-07 | End: 2023-10-11

## 2023-10-07 NOTE — PATIENT INSTRUCTIONS
Complete previously prescribed oral steroid and administer MDI as needed to reduce cough. Take newly prescribed oral antibiotic for sinus infection. May instill saline nasal spray as needed to reduce congestion. Follow up as needed for any persistent or worsening symptoms.

## 2023-10-07 NOTE — PROGRESS NOTES
[facility]        NAME: Maggie Moore is a 43 y.o. male  : 1985    MRN: [id number]  DATE: 2023  TIME: 3:53 PM    Assessment and Plan   Acute cough [R05.1]  1. Acute cough        2. Acute sinusitis, recurrence not specified, unspecified location  azithromycin (ZITHROMAX) 250 mg tablet            Patient Instructions       Follow up with PCP in 3-5 days. Proceed to  ER if symptoms worsen. Patient Instructions   Complete previously prescribed oral steroid and administer MDI as needed to reduce cough. Take newly prescribed oral antibiotic for sinus infection. May instill saline nasal spray as needed to reduce congestion. Follow up as needed for any persistent or worsening symptoms. Chief Complaint     Chief Complaint   Patient presents with   • Cough     Seen 2 days ago with cough. Told bronchitis. Given steroids MDI and Teslon Pearls. Feels like he is worse. History of Present Illness       Here today with persistent cough, sinus congestion over past 6 days. Afebrile. Was seen on 10/5 in [facility] and prescribed oral steroid, MDI, and Tessalon Perle. No sx improvement. At home CoVid swab on  negative. GF in close contact was ill with URI sx recently also. Pt concerned over wheezing. No pain with deep inspiration. No sore throat. No sinus headache. Post tussive emesis this morning x 1      Review of Systems   Review of Systems   Constitutional: Negative for fever. HENT: Positive for congestion. Negative for ear pain, sneezing and sore throat. Eyes: Negative for discharge and redness. Respiratory: Positive for cough. Negative for chest tightness and shortness of breath. Cardiovascular: Negative for chest pain. Gastrointestinal: Negative for abdominal pain, diarrhea, nausea and vomiting. Genitourinary: Negative for decreased urine volume. Musculoskeletal: Negative for myalgias.    Allergic/Immunologic: Negative for environmental allergies. Neurological: Negative for dizziness, syncope and headaches. Hematological: Negative for adenopathy. Psychiatric/Behavioral: Negative for sleep disturbance. Current Medications       Current Outpatient Medications:   •  albuterol (PROVENTIL HFA,VENTOLIN HFA) 90 mcg/act inhaler, Inhale 2 puffs every 6 (six) hours as needed for wheezing, Disp: 8 g, Rfl: 0  •  azithromycin (ZITHROMAX) 250 mg tablet, Take 2 tablets today then 1 tablet daily x 4 days, Disp: 6 tablet, Rfl: 0  •  benzonatate (TESSALON PERLES) 100 mg capsule, Take 1 capsule (100 mg total) by mouth 3 (three) times a day as needed for cough, Disp: 20 capsule, Rfl: 0  •  predniSONE 10 mg tablet, Take 4 tablets (40 mg total) by mouth daily for 4 days, Disp: 16 tablet, Rfl: 0  •  naproxen (NAPROSYN) 500 mg tablet, Take 250 mg by mouth 2 (two) times a day with meals (Patient not taking: Reported on 4/7/2022), Disp: , Rfl:   •  terbinafine (LamISIL) 1 % cream, , Disp: , Rfl:     Current Allergies     Allergies as of 10/07/2023   • (No Known Allergies)            The following portions of the patient's history were reviewed and updated as appropriate: allergies, current medications, past family history, past medical history, past social history, past surgical history and problem list.     Past Medical History:   Diagnosis Date   • Lyme disease        No past surgical history on file. Family History   Problem Relation Age of Onset   • Arthritis Maternal Grandmother    • Arthritis Maternal Grandfather          Medications have been verified. Objective   /75   Pulse 75   Temp 97.8 °F (36.6 °C) (Temporal)   Resp 20   SpO2 95%   No LMP for male patient. Physical Exam     Physical Exam  Vitals reviewed. Constitutional:       General: He is not in acute distress. Appearance: He is well-developed and well-groomed. He is not ill-appearing. HENT:      Head: Normocephalic.       Right Ear: Tympanic membrane and ear canal normal.      Left Ear: Ear canal normal. Tympanic membrane is injected. Nose: Congestion present. Right Sinus: Maxillary sinus tenderness and frontal sinus tenderness present. Left Sinus: Maxillary sinus tenderness and frontal sinus tenderness present. Mouth/Throat:      Lips: Pink. Mouth: Mucous membranes are moist.      Pharynx: Posterior oropharyngeal erythema (injection, pnd) present. Eyes:      General: Lids are normal.         Right eye: No discharge. Left eye: No discharge. Cardiovascular:      Rate and Rhythm: Regular rhythm. Heart sounds: Normal heart sounds, S1 normal and S2 normal.   Pulmonary:      Effort: Pulmonary effort is normal.      Breath sounds: Normal breath sounds and air entry. No wheezing or rhonchi. Musculoskeletal:      Cervical back: Normal range of motion. Lymphadenopathy:      Cervical: No cervical adenopathy. Skin:     General: Skin is warm and dry. Neurological:      Mental Status: He is alert. Psychiatric:         Behavior: Behavior normal. Behavior is cooperative.

## 2023-10-29 NOTE — TELEPHONE ENCOUNTER
Regarding: symptomatic-covid test/congestion, cough, body aches  ----- Message from Gregory Hanson sent at 8/19/2021  8:05 AM EDT -----  "For two days, I have congestion, cough, body aches, loss of smell, and fever " Statement Selected Patient with one or more new problems requiring additional work-up/treatment.

## 2024-11-06 ENCOUNTER — OFFICE VISIT (OUTPATIENT)
Dept: URGENT CARE | Facility: CLINIC | Age: 39
End: 2024-11-06
Payer: COMMERCIAL

## 2024-11-06 VITALS
OXYGEN SATURATION: 95 % | DIASTOLIC BLOOD PRESSURE: 81 MMHG | SYSTOLIC BLOOD PRESSURE: 145 MMHG | TEMPERATURE: 98.4 F | HEART RATE: 84 BPM | RESPIRATION RATE: 16 BRPM

## 2024-11-06 DIAGNOSIS — L73.9 FOLLICULITIS: Primary | ICD-10-CM

## 2024-11-06 PROCEDURE — G0382 LEV 3 HOSP TYPE B ED VISIT: HCPCS | Performed by: FAMILY MEDICINE

## 2024-11-06 PROCEDURE — S9083 URGENT CARE CENTER GLOBAL: HCPCS | Performed by: FAMILY MEDICINE

## 2024-11-06 RX ORDER — DOXYCYCLINE 100 MG/1
100 TABLET ORAL 2 TIMES DAILY
Qty: 14 TABLET | Refills: 0 | Status: SHIPPED | OUTPATIENT
Start: 2024-11-06 | End: 2024-11-13

## 2024-11-07 NOTE — PROGRESS NOTES
[facility]        NAME: Brent Hill is a 39 y.o. male  : 1985    MRN: [id number]  DATE: 2024  TIME: 7:20 PM    Assessment and Plan   Folliculitis [L73.9]  1. Folliculitis  doxycycline (ADOXA) 100 MG tablet    Ambulatory Referral to Dermatology            Patient Instructions     Folliculitis diagnosed on exam today. ABX sent to the pharmacy. Follow up with PCP in 3-5 days if no improvement. May consider referral to derm if no improvement. Proceed to ER if symptoms worsen.    Chief Complaint     Chief Complaint   Patient presents with    Rash     States he has a hx of folliculitis. Having spots all over. Also states he has a bump on neck.         History of Present Illness     Brent Hill is a 39 y.o. male presenting to the office today complaining of a skin rash. Symptoms have been coming and going for years. He notes that he has a current flare up of his lesions on his lower extremities and buttocks. He describes them as small pustules. He states that they seem to get worse with yard work and his employment. He has tried OTC cleansers like Hibiclens and benzyl peroxide. He has not found a solution for the recurrence.     Review of Systems     Review of Systems   Constitutional:  Negative for chills and fever.   HENT:  Negative for congestion and sore throat.    Eyes:  Negative for discharge and itching.   Respiratory:  Negative for cough and wheezing.    Gastrointestinal:  Negative for abdominal pain, nausea and vomiting.   Genitourinary:  Negative for dysuria and flank pain.   Musculoskeletal:  Negative for arthralgias, myalgias and neck pain.   Skin:  Positive for rash.   Allergic/Immunologic: Negative for food allergies.   Neurological:  Negative for light-headedness and headaches.   Psychiatric/Behavioral:  Negative for agitation. The patient is not nervous/anxious.        Current Medications       Current Outpatient Medications:     doxycycline (ADOXA) 100 MG  tablet, Take 1 tablet (100 mg total) by mouth 2 (two) times a day for 7 days, Disp: 14 tablet, Rfl: 0    albuterol (PROVENTIL HFA,VENTOLIN HFA) 90 mcg/act inhaler, Inhale 2 puffs every 6 (six) hours as needed for wheezing (Patient not taking: Reported on 11/6/2024), Disp: 8 g, Rfl: 0    benzonatate (TESSALON PERLES) 100 mg capsule, Take 1 capsule (100 mg total) by mouth 3 (three) times a day as needed for cough (Patient not taking: Reported on 11/6/2024), Disp: 20 capsule, Rfl: 0    naproxen (NAPROSYN) 500 mg tablet, Take 250 mg by mouth 2 (two) times a day with meals (Patient not taking: Reported on 4/7/2022), Disp: , Rfl:     terbinafine (LamISIL) 1 % cream, , Disp: , Rfl:     Current Allergies     Allergies as of 11/06/2024    (No Known Allergies)            The following portions of the patient's history were reviewed and updated as appropriate: allergies, current medications, past family history, past medical history, past social history, past surgical history and problem list.     Past Medical History:   Diagnosis Date    Lyme disease        No past surgical history on file.    Family History   Problem Relation Age of Onset    Arthritis Maternal Grandmother     Arthritis Maternal Grandfather        Medications have been verified.    Objective     /81   Pulse 84   Temp 98.4 °F (36.9 °C) (Temporal)   Resp 16   SpO2 95%   No LMP for male patient.     Physical Exam     Physical Exam  Vitals reviewed.   Constitutional:       General: He is not in acute distress.     Appearance: Normal appearance.   HENT:      Head: Normocephalic and atraumatic.   Eyes:      Extraocular Movements: Extraocular movements intact.      Conjunctiva/sclera: Conjunctivae normal.   Pulmonary:      Effort: Pulmonary effort is normal. No respiratory distress.   Skin:     General: Skin is warm and dry.      Findings: Rash present. Rash is pustular.          Neurological:      General: No focal deficit present.      Mental Status: He  is alert.   Psychiatric:         Mood and Affect: Mood normal.         Behavior: Behavior normal.         Thought Content: Thought content normal.         Judgment: Judgment normal.